# Patient Record
Sex: FEMALE | Race: AMERICAN INDIAN OR ALASKA NATIVE
[De-identification: names, ages, dates, MRNs, and addresses within clinical notes are randomized per-mention and may not be internally consistent; named-entity substitution may affect disease eponyms.]

---

## 2019-03-11 ENCOUNTER — HOSPITAL ENCOUNTER (EMERGENCY)
Dept: HOSPITAL 5 - ED | Age: 24
Discharge: HOME | End: 2019-03-11
Payer: COMMERCIAL

## 2019-03-11 VITALS — DIASTOLIC BLOOD PRESSURE: 88 MMHG | SYSTOLIC BLOOD PRESSURE: 120 MMHG

## 2019-03-11 DIAGNOSIS — F17.200: ICD-10-CM

## 2019-03-11 DIAGNOSIS — H66.002: Primary | ICD-10-CM

## 2019-03-11 DIAGNOSIS — N76.0: ICD-10-CM

## 2019-03-11 LAB
BACTERIA #/AREA URNS HPF: (no result) /HPF
BILIRUB UR QL STRIP: (no result)
BLOOD UR QL VISUAL: (no result)
MUCOUS THREADS #/AREA URNS HPF: (no result) /HPF
PH UR STRIP: 6 [PH] (ref 5–7)
RBC #/AREA URNS HPF: 44 /HPF (ref 0–6)
UROBILINOGEN UR-MCNC: < 2 MG/DL (ref ?–2)
WBC #/AREA URNS HPF: 6 /HPF (ref 0–6)

## 2019-03-11 PROCEDURE — 99284 EMERGENCY DEPT VISIT MOD MDM: CPT

## 2019-03-11 PROCEDURE — 87210 SMEAR WET MOUNT SALINE/INK: CPT

## 2019-03-11 PROCEDURE — 81001 URINALYSIS AUTO W/SCOPE: CPT

## 2019-03-11 PROCEDURE — 96372 THER/PROPH/DIAG INJ SC/IM: CPT

## 2019-03-11 PROCEDURE — 87591 N.GONORRHOEAE DNA AMP PROB: CPT

## 2019-03-11 NOTE — EMERGENCY DEPARTMENT REPORT
ED ENT HPI





- General


Chief complaint: Earache


Stated complaint: (R) EAR PAIN/VAG PAIN


Time Seen by Provider: 19 12:29


Source: patient


Mode of arrival: Ambulatory


Limitations: No Limitations





- History of Present Illness


Initial comments: 


24F female presents with complaint of approximately 4 days of right sided 

earache.  Patient is awake alert and oriented 3 fully lucid and does not appear

to be in acute distress.  Patient states she works in a  and is concerned

she may have gotten an ear infection from a child at  center.  Patient 

also states that she is experiencing approximately one week of vaginal 

discomfort and unusual discharge.  Patient is not overtly concerned for STD 

exposure at this time.  States she may have mild dysuria.  Denies smoking 

drinking or drug use.  LMP current.


MD complaint: ear pain


Onset/Timin


-: days(s)


Location: R ear


Severity: moderate


Severity scale (0 -10): 4


Quality: aching


Consistency: constant


Improves with: none


Worsens with: none





- Related Data


                                  Previous Rx's











 Medication  Instructions  Recorded  Last Taken  Type


 


Amoxicillin/K Clav Tab [Augmentin 1 tab PO Q12H #20 tablet 03/12/15 Unknown Rx





875MG]    


 


predniSONE [Deltasone] 50 mg PO QDAY #3 tab 03/12/15 Unknown Rx


 


HYDROcodone/ACETAMINOPHEN [Norco 1 each PO Q6H PRN #25 tablet 06/12/15 Unknown 

Rx





7.5-325 mg TAB]    


 


Ibuprofen [Motrin 800 MG tab] 800 mg PO Q8H #90 tablet 06/12/15 Unknown Rx


 


HYDROcodone/APAP 5-325 [[place] 1 each PO Q6HR PRN #14 tablet 08/23/15 Unknown Rx





5-325 mg TAB]    


 


Ibuprofen [Motrin 600 MG tab] 600 mg PO Q8H PRN #30 tablet 08/23/15 Unknown Rx


 


HYDROcodone/APAP 5-325 [[place] 1 each PO Q6HR PRN #15 tablet 16 Unknown Rx





5/325]    


 


cephALEXin [Keflex] 500 mg PO Q8HR #42 cap 16 Unknown Rx


 


metroNIDAZOLE [Flagyl TAB] 500 mg PO Q12HR #14 tab 16 Unknown Rx


 


Ibuprofen [Motrin 600 MG tab] 600 mg PO Q8H PRN #30 tablet 16 Unknown Rx


 


Cyclobenzaprine [Flexeril] 10 mg PO TID PRN #15 tablet 16 Unknown Rx


 


Ibuprofen [Motrin] 800 mg PO Q8HR PRN #15 tablet 16 Unknown Rx


 


Amoxicillin/Potassium Clav 1 each PO BID #14 tablet 19 Unknown Rx





[Augmentin 875-125 Tablet]    


 


metroNIDAZOLE [Metronidazole] 500 mg PO BID #14 tablet 19 Unknown Rx











                                    Allergies











Allergy/AdvReac Type Severity Reaction Status Date / Time


 


No Known Allergies Allergy   Verified 03/12/15 10:45














ED Dental HPI





- General


Chief complaint: Earache


Stated complaint: (R) EAR PAIN/VAG PAIN


Time Seen by Provider: 19 12:29


Source: patient


Mode of arrival: Ambulatory


Limitations: No Limitations





- Related Data


                                  Previous Rx's











 Medication  Instructions  Recorded  Last Taken  Type


 


Amoxicillin/K Clav Tab [Augmentin 1 tab PO Q12H #20 tablet 03/12/15 Unknown Rx





875MG]    


 


predniSONE [Deltasone] 50 mg PO QDAY #3 tab 03/12/15 Unknown Rx


 


HYDROcodone/ACETAMINOPHEN [[place] 1 each PO Q6H PRN #25 tablet 06/12/15 Unknown 

Rx





7.5-325 mg TAB]    


 


Ibuprofen [Motrin 800 MG tab] 800 mg PO Q8H #90 tablet 06/12/15 Unknown Rx


 


HYDROcodone/APAP 5-325 [[place] 1 each PO Q6HR PRN #14 tablet 08/23/15 Unknown Rx





5-325 mg TAB]    


 


Ibuprofen [Motrin 600 MG tab] 600 mg PO Q8H PRN #30 tablet 08/23/15 Unknown Rx


 


HYDROcodone/APAP 5-325 [[place] 1 each PO Q6HR PRN #15 tablet 16 Unknown Rx





5/325]    


 


cephALEXin [Keflex] 500 mg PO Q8HR #42 cap 16 Unknown Rx


 


metroNIDAZOLE [Flagyl TAB] 500 mg PO Q12HR #14 tab 16 Unknown Rx


 


Ibuprofen [Motrin 600 MG tab] 600 mg PO Q8H PRN #30 tablet 16 Unknown Rx


 


Cyclobenzaprine [Flexeril] 10 mg PO TID PRN #15 tablet 16 Unknown Rx


 


Ibuprofen [Motrin] 800 mg PO Q8HR PRN #15 tablet 16 Unknown Rx


 


Amoxicillin/Potassium Clav 1 each PO BID #14 tablet 19 Unknown Rx





[Augmentin 875-125 Tablet]    


 


metroNIDAZOLE [Metronidazole] 500 mg PO BID #14 tablet 19 Unknown Rx











                                    Allergies











Allergy/AdvReac Type Severity Reaction Status Date / Time


 


No Known Allergies Allergy   Verified 03/12/15 10:45














ED Review of Systems


ROS: 


Stated complaint: (R) EAR PAIN/VAG PAIN


Other details as noted in HPI





Constitutional: denies: chills, fever


Eyes: denies: eye pain, eye discharge, vision change


ENT: ear pain.  denies: throat pain


Respiratory: denies: cough, shortness of breath, wheezing


Cardiovascular: denies: chest pain, palpitations


Endocrine: no symptoms reported


Gastrointestinal: denies: abdominal pain, nausea, diarrhea


Genitourinary: dysuria, discharge.  denies: urgency


Musculoskeletal: denies: back pain, joint swelling, arthralgia


Skin: denies: rash, lesions


Neurological: denies: headache, weakness, paresthesias


Psychiatric: denies: anxiety, depression


Hematological/Lymphatic: denies: easy bleeding, easy bruising





ED Past Medical Hx





- Past Medical History


Previous Medical History?: No





- Surgical History


Past Surgical History?: Yes


Additional Surgical History: 2 screws in bilateral hips





- Social History


Smoking Status: Current Every Day Smoker


Substance Use Type: Alcohol





- Medications


Home Medications: 


                                Home Medications











 Medication  Instructions  Recorded  Confirmed  Last Taken  Type


 


Amoxicillin/K Clav Tab [Augmentin 1 tab PO Q12H #20 tablet 03/12/15  Unknown Rx





875MG]     


 


predniSONE [Deltasone] 50 mg PO QDAY #3 tab 03/12/15  Unknown Rx


 


HYDROcodone/ACETAMINOPHEN [Norco 1 each PO Q6H PRN #25 tablet 06/12/15  Unknown 

Rx





7.5-325 mg TAB]     


 


Ibuprofen [Motrin 800 MG tab] 800 mg PO Q8H #90 tablet 06/12/15  Unknown Rx


 


HYDROcodone/APAP 5-325 [[place] 1 each PO Q6HR PRN #14 tablet 08/23/15  Unknown Rx





5-325 mg TAB]     


 


Ibuprofen [Motrin 600 MG tab] 600 mg PO Q8H PRN #30 tablet 08/23/15  Unknown Rx


 


HYDROcodone/APAP 5-325 [[place] 1 each PO Q6HR PRN #15 tablet 16  Unknown Rx





5/325]     


 


cephALEXin [Keflex] 500 mg PO Q8HR #42 cap 16  Unknown Rx


 


metroNIDAZOLE [Flagyl TAB] 500 mg PO Q12HR #14 tab 16  Unknown Rx


 


Ibuprofen [Motrin 600 MG tab] 600 mg PO Q8H PRN #30 tablet 16  Unknown Rx


 


Cyclobenzaprine [Flexeril] 10 mg PO TID PRN #15 tablet 16  Unknown Rx


 


Ibuprofen [Motrin] 800 mg PO Q8HR PRN #15 tablet 16  Unknown Rx


 


Amoxicillin/Potassium Clav 1 each PO BID #14 tablet 19  Unknown Rx





[Augmentin 875-125 Tablet]     


 


metroNIDAZOLE [Metronidazole] 500 mg PO BID #14 tablet 19  Unknown Rx














ED Physical Exam





- General


Limitations: No Limitations


General appearance: alert, in no apparent distress





- Head


Head exam: Present: atraumatic, normocephalic





- Eye


Eye exam: Present: normal appearance





- ENT


ENT exam: Present: mucous membranes moist





- Expanded ENT Exam


  ** Expanded


TM/Canal exam: Erythema: Right TM, Cerumen Impaction: Right TM, Left TM (there 

is no mastoid tenderness on exam)





- Neck


Neck exam: Present: normal inspection





- Respiratory


Respiratory exam: Present: normal lung sounds bilaterally.  Absent: respiratory 

distress





- Cardiovascular


Cardiovascular Exam: Present: regular rate, normal rhythm.  Absent: systolic 

murmur, diastolic murmur, rubs, gallop





- GI/Abdominal


GI/Abdominal exam: Present: soft, normal bowel sounds





- 


Speculum exam: Present: vaginal discharge, vaginal bleeding


Bi-manual exam: Present: normal bi-manual exam





- Extremities Exam


Extremities exam: Present: normal inspection





- Back Exam


Back exam: Present: normal inspection





- Neurological Exam


Neurological exam: Present: alert, oriented X3





- Psychiatric


Psychiatric exam: Present: normal affect, normal mood





- Skin


Skin exam: Present: warm, dry, intact, normal color.  Absent: rash





ED Course


                                   Vital Signs











  19





  12:30 18:01


 


Temperature 98.5 F 


 


Pulse Rate 93 H 90


 


Respiratory 18 18





Rate  


 


Blood Pressure 123/95 


 


Blood Pressure  120/88





[Right]  


 


O2 Sat by Pulse 100 99





Oximetry  














ED Medical Decision Making





- Medical Decision Making


A/P: Otitis media, bacterial vaginosis, possible


1-empiric treatment for otitis media with Augmentin course


2-empiric treatment for BV with metronidazole course


3-patients treated empirically for possible GC infection with azithromycin and 

ceftriaxone


4- follow-up with primary care


Critical care attestation.: 


If time is entered above; I have spent that time in minutes in the direct care 

of this critically ill patient, excluding procedure time.








ED Disposition


Clinical Impression: 


 Bacterial vaginosis, Vaginal discharge





Otitis media


Qualifiers:


 Otitis media type: suppurative Chronicity: acute Laterality: unspecified 

laterality Recurrence: not specified as recurrent Spontaneous tympanic membrane 

rupture: without spontaneous rupture Qualified Code(s): H66.009 - Acute 

suppurative otitis media without spontaneous rupture of ear drum, unspecified 

ear





Disposition: DC- TO HOME OR SELFCARE


Is pt being admited?: No


Does the pt Need Aspirin: No


Condition: Stable


Instructions:  Bacterial Vaginosis (ED), Otitis Media (ED)


Prescriptions: 


Amoxicillin/Potassium Clav [Augmentin 875-125 Tablet] 1 each PO BID #14 tablet


metroNIDAZOLE [Metronidazole] 500 mg PO BID #14 tablet


Referrals: 


[facility], MD [Primary Care Provider] - 3-5 Days


Forms:  [facility]I Treatment and Prevention, Work/School Release Form(ED)


Time of Disposition: 17:43

## 2019-03-11 NOTE — EMERGENCY DEPARTMENT REPORT
Blank Doc





- Documentation


Documentation: 





This is a 24-year-old female that presents with right ear pain and vaginal dis

charged with dysuria.  Stated is on her menstrual cycle today.





This initial assessment/diagnostic orders/clinical plan/treatment(s) is/are 

subject to change based on patient's health status, clinical progression and re-

assessment by fellow clinical providers in the ED.  Further treatment and workup

at subsequent clinical providers discretion.  Patient/guardians urged not to 

elope from the ED as their condition may be serious if not clinically assessed 

and managed.  Initial orders include:


1- Patient sent to [facility] for further evaluation and treatment


2- wet prep


3- UA

## 2021-10-28 ENCOUNTER — HOSPITAL ENCOUNTER (OUTPATIENT)
Dept: HOSPITAL 5 - TRG | Age: 26
Discharge: HOME | End: 2021-10-28
Attending: OBSTETRICS & GYNECOLOGY
Payer: COMMERCIAL

## 2021-10-28 DIAGNOSIS — Z3A.40: ICD-10-CM

## 2021-10-28 DIAGNOSIS — Z34.93: Primary | ICD-10-CM

## 2021-10-28 PROCEDURE — 76819 FETAL BIOPHYS PROFIL W/O NST: CPT

## 2021-10-28 PROCEDURE — 76815 OB US LIMITED FETUS(S): CPT

## 2021-10-28 PROCEDURE — 59025 FETAL NON-STRESS TEST: CPT

## 2021-10-28 NOTE — ULTRASOUND REPORT
ULTRASOUND OBSTETRIC LIMITED 

ULTRASOUND FETAL BIOPHYSICAL PROFILE



INDICATION / CLINICAL INFORMATION: JHONATHAN . IUP at 40.3 weeks. NRFHT..

Clinical Gestational Age (GA) in weeks, days: 40, 3 



TECHNIQUE: Transabdominal.



COMPARISON: None available.



FINDINGS:



FETAL BREATHING MOVEMENT = 2

GROSS BODY MOVEMENT = 2

FETAL TONE = 2

QUALITATIVE AMNIOTIC FLUID VOLUME = 2



TOTAL BIOPHYSICAL SCORE = 8/8



FETAL HEART RATE (beats per minute): 137 



AMNIOTIC FLUID INDEX (cm) =  6.1   (normal = 7-24 cm)

PRESENTATION: Cephalic. 



ADDITIONAL FINDINGS: None.



IMPRESSION:

1. Fetal Biophysical Score = 8/8 

2. Additional findings as above



Signer Name: Ozzy Rey DO 

Signed: 10/28/2021 8:05 PM

Workstation Name: [id number]

## 2021-10-28 NOTE — ULTRASOUND REPORT
ULTRASOUND OBSTETRIC LIMITED 

ULTRASOUND FETAL BIOPHYSICAL PROFILE



INDICATION / CLINICAL INFORMATION: JHONATHAN . IUP at 40.3 weeks. NRFHT..

Clinical Gestational Age (GA) in weeks, days: 40, 3 



TECHNIQUE: Transabdominal.



COMPARISON: None available.



FINDINGS:



FETAL BREATHING MOVEMENT = 2

GROSS BODY MOVEMENT = 2

FETAL TONE = 2

QUALITATIVE AMNIOTIC FLUID VOLUME = 2



TOTAL BIOPHYSICAL SCORE = 8/8



FETAL HEART RATE (beats per minute): 137 



AMNIOTIC FLUID INDEX (cm) =  6.1   (normal = 7-24 cm)

PRESENTATION: Cephalic. 



ADDITIONAL FINDINGS: None.



IMPRESSION:

1. Fetal Biophysical Score = 8/8 

2. Additional findings as above



Signer Name: Ozzy Rey DO 

Signed: 10/28/2021 8:05 PM

Workstation Name: [id number]

## 2022-08-22 ENCOUNTER — HOSPITAL ENCOUNTER (EMERGENCY)
Dept: HOSPITAL 5 - ED | Age: 27
LOS: 1 days | Discharge: HOME | End: 2022-08-23
Payer: COMMERCIAL

## 2022-08-22 VITALS — SYSTOLIC BLOOD PRESSURE: 126 MMHG | DIASTOLIC BLOOD PRESSURE: 87 MMHG

## 2022-08-22 DIAGNOSIS — R51.9: Primary | ICD-10-CM

## 2022-08-22 DIAGNOSIS — Z87.891: ICD-10-CM

## 2022-08-22 PROCEDURE — 70450 CT HEAD/BRAIN W/O DYE: CPT

## 2022-08-22 PROCEDURE — 99283 EMERGENCY DEPT VISIT LOW MDM: CPT

## 2022-08-22 PROCEDURE — 96372 THER/PROPH/DIAG INJ SC/IM: CPT

## 2022-08-22 NOTE — EMERGENCY DEPARTMENT REPORT
ED Headache HPI





- General


Chief Complaint: Eye Problems


Stated Complaint: RIGHT EYE INJURY/CANT SEE


Time Seen by Provider: 08/22/22 17:20





- History of Present Illness


Initial Comments: 





27-year-old black female with no past medical history resents to the emergency 

department for evaluation of headache and vision changes to the right only that 

started this morning.  She states that when she woke up she had a sharp severe 

headache to the right side only followed by some blurred vision.  She states 

that her vision has improved significantly but she still has a headache.  She 

denies injury or trauma, fever, photophobia, dizziness, nausea, and vomiting.  

She states that she has had some congestion.


Timing/Duration: 1-3 hours


Quality: moderate, severe


Head Injury Location: frontal


Associated Symptoms: facial pain, nasal congestion, vision changes.  denies: 

confusion, fatigue, fever/chills, flushing, loss of consciousness, n

ausea/vomiting, nasal drainage, numbness in legs/feet, rash, seizures, sinus 

infection, stiff neck, weakness


Allergies/Adverse Reactions: 


Allergies





No Known Allergies Allergy (Verified 03/12/15 10:45)


   








Home Medications: 


Ambulatory Orders





Prenat 115/Iron Fum/Folic/Dss PO DAILY 11/03/21 


Ferrous Sulfate [Feosol 325 MG tab] 325 mg PO BID #60 tablet 11/05/21 


Ibuprofen [Motrin] 800 mg PO TID PRN #30 tablet 11/05/21 


oxyCODONE /ACETAMINOPHEN [Percocet 5/325 mg] 1 - 2 tab PO Q6HR PRN #20 tablet 

11/05/21 


NIFEdipine XL [Procardia Xl] 30 mg PO QDAY 30 Days #30 tablet 11/09/21 


labetaloL [Labetalol 100mg TAB] 300 mg PO BID 30 Days #60 tablet 11/09/21 


Levocetirizine Dihydrochloride [Xyzal] 5 mg PO QPM #15 tab 08/22/22 


predniSONE [Deltasone] 50 mg PO QDAY 5 Days #5 tab 08/22/22 











ED Review of Systems


ROS: 


Stated complaint: RIGHT EYE INJURY/CANT SEE


Other details as noted in HPI





Comment: All other systems reviewed and negative


Constitutional: denies: chills, fever


Eyes: eye pain, vision change


ENT: congestion


Respiratory: denies: shortness of breath


Cardiovascular: denies: chest pain


Gastrointestinal: denies: abdominal pain, nausea, vomiting


Musculoskeletal: denies: back pain


Skin: denies: rash, lesions


Neurological: headache.  denies: weakness, numbness, paresthesias, confusion, 

abnormal gait, vertigo


Psychiatric: denies: anxiety, depression





ED Past Medical Hx





- Past Medical History


Hx Hypertension: No


Hx Diabetes: No


Hx Deep Vein Thrombosis: No


Hx Renal Disease: No


Hx Sickle Cell Disease: No


Hx Seizures: No


Hx Asthma: No


Hx HIV: No





- Surgical History


Additional Surgical History: 2 screws in bilateral hips





- Social History


Smoking Status: Former Smoker





- Medications


Home Medications: 


                                Home Medications











 Medication  Instructions  Recorded  Confirmed  Last Taken  Type


 


Prenat 115/Iron Fum/Folic/Dss PO DAILY 11/03/21  10/24/21 History


 


Ferrous Sulfate [Feosol 325 MG tab] 325 mg PO BID #60 tablet 11/05/21  Unknown 

Rx


 


Ibuprofen [Motrin] 800 mg PO TID PRN #30 tablet 11/05/21  Unknown Rx


 


oxyCODONE /ACETAMINOPHEN [Percocet 1 - 2 tab PO Q6HR PRN #20 tablet 11/05/21  

Unknown Rx





5/325 mg]     


 


NIFEdipine XL [Procardia Xl] 30 mg PO QDAY 30 Days #30 tablet 11/09/21  Unknown 

Rx


 


labetaloL [Labetalol 100mg TAB] 300 mg PO BID 30 Days #60 tablet 11/09/21  

Unknown Rx


 


Levocetirizine Dihydrochloride 5 mg PO QPM #15 tab 08/22/22  Unknown Rx





[Xyzal]     


 


predniSONE [Deltasone] 50 mg PO QDAY 5 Days #5 tab 08/22/22  Unknown Rx














ED Physical Exam





- General


Limitations: No Limitations


General appearance: alert, in no apparent distress





- Head


Head exam: Present: atraumatic, normocephalic





- Eye


Eye exam: Present: normal appearance, PERRL.  Absent: conjunctival injection, 

periorbital swelling, periorbital tenderness





- Expanded Eye Exam


  ** Expanded


Pupils: Regular, Round: Bilateral, Reactive: Bilateral


Sclera/Conjunctival: Normal Inspection: Bilateral





- ENT


ENT exam: Present: mucous membranes moist, TM's normal bilaterally, normal 

external ear exam.  Absent: normal exam (Bilateral nasal mucosal edema with 

clear rhinorrhea), normal orophraynx (Erythema noted to posterior oropharynx)





- Neck


Neck exam: Present: normal inspection.  Absent: tenderness





- Respiratory


Respiratory exam: Present: normal lung sounds bilaterally.  Absent: respiratory 

distress, wheezes, rales, rhonchi, chest wall tenderness





- Cardiovascular


Cardiovascular Exam: Present: regular rate, normal heart sounds





- GI/Abdominal


GI/Abdominal exam: Present: soft, normal bowel sounds.  Absent: distended, 

tenderness, guarding, rebound, rigid





- Extremities Exam


Extremities exam: Present: normal inspection, normal capillary refill





- Back Exam


Back exam: Present: normal inspection.  Absent: CVA tenderness (R), CVA 

tenderness (L)





- Neurological Exam


Neurological exam: Present: alert, oriented X3, CN II-XII intact, normal gait





- Expanded Neurological Exam


  ** Expanded


Patient oriented to: Present: person, place, time


Speech: Present: fluid speech


Cranial nerves: EOM's Intact: Normal, Gag Reflex: Normal, Tongue Deviation: 

Normal, Nystagmus: Normal


Ataxia: Absent: yes


Cerebellar function: [place]: Normal


Sensory exam: Upper Extremity Light Touch: Normal, Upper Extremity Temperature: 

Normal, Lower Extremity Light Touch: Normal, Lower Extremity Temperature: Normal


Motor strength exam: RUE: 5, LUE: 5, RLE: 5, LLE: 5


Best Eye Response ([place]): (4) open spontaneously


Best Motor Response ([place]): (6) obeys commands


Best Verbal Response ([place]): (5) oriented


[place] Total: 15





- Psychiatric


Psychiatric exam: Present: normal affect, normal mood





- Skin


Skin exam: Present: warm, dry, intact, normal color





ED Course


                                   Vital Signs











  08/22/22 08/22/22 08/22/22





  14:33 18:48 20:45


 


Temperature 98.9 F  


 


Pulse Rate 83  81


 


Respiratory 14 14 14





Rate   


 


Blood Pressure 123/85  126/87





[Right]   


 


O2 Sat by Pulse 98  100





Oximetry   














- Reevaluation(s)


Reevaluation #1: 





08/22/22 19:24


Headache and vision improved after medication.





ED Medical Decision Making





- Radiology Data


Radiology results: report reviewed, image reviewed





CT head and neck without contrast:





 FINDINGS:  


 HEMORRHAGE: No evidence of intracranial hemorrhage or extra-axial fluid 

collection.  


 EXTRA-AXIAL SPACES: Cortical sulci, sylvian fissures and basilar cisterns have 

an unremarkable 


appearance.  


 VENTRICULAR SYSTEM: The third and lateral ventricles are of normal size and 

configuration.  


 CEREBRAL PARENCHYMA: No areas of abnormal brain parenchymal attenuation are 

identified. There is no


indication of recent infarction.   


 MIDLINE SHIFT OR HERNIATION: There is no mass effect.  


 CEREBELLUM / BRAINSTEM: Brainstem and cerebellum have an unremarkable 

appearance.  


 MIDLINE STRUCTURES:No abnormalities of the pituitary gland or pineal region are

 identified.  


 


 CRANIOCERVICAL JUNCTION: There is a suggestion of borderline cerebellar 

tonsillar ectopia. Heart an


artifact limits evaluation of the craniocervical junction on this study.  


 INTRACRANIAL VESSELS:No abnormalities are identified on this noncontrast head 

CT.  


 ORBITS: visualized portions of the orbits have an unremarkable appearance.  


 SOFT TISSUES of HEAD: No significant abnormality.  


 CALVARIUM: Evaluation of bone windows reveals no abnormalities.  


 PARANASAL SINUSES / MASTOID AIR CELLS: Frontal sinuses did not develop in this 

individual. 


Visualized paranasal sinuses are free from inflammatory mucosal disease. Mastoid

 air cells are 


normally pneumatized.  


 


 IMPRESSION:  


 1. No significant intracranial abnormality.  





- Medical Decision Making





27-year-old black female with no past medical history resents to the emergency 

department for evaluation of headache and vision changes to the right only that 

started this morning.  She states that when she woke up she had a sharp severe 

headache to the right side only followed by some blurred vision.  She states 

that her vision has improved significantly but she still has a headache.  She 

denies injury or trauma, fever, photophobia, dizziness, nausea, and vomiting.  

She states that she has had some congestion.





Physical exam unremarkable.  CT head and brain without any acute abnormalities 

noted.  Headache resolved after medications.  Vision improved.  Patient advised 

to take medication as prescribed, follow-up with ophthalmology, follow-up with 

her primary care provider, and return to the emergency department as needed.  

She verbalizes understanding of and agreement with plan of care.


Critical care attestation.: 


If time is entered above; I have spent that time in minutes in the direct care 

of this critically ill patient, excluding procedure time.








ED Disposition


Clinical Impression: 


 Headache





Disposition: 01 HOME / SELF CARE / HOMELESS


Is pt being admited?: No


Does the pt Need Aspirin: No


Condition: Stable


Instructions:  General Headache Without Cause, Easy-to-Read


Additional Instructions: 


Take medications as prescribed.  Follow-up with ophthalmology in the next 1 to 2

 days if no improvement or worsening symptoms.  Return to the emergency 

department as needed.


Prescriptions: 


predniSONE [Deltasone] 50 mg PO QDAY 5 Days #5 tab


Levocetirizine Dihydrochloride [Xyzal] 5 mg PO QPM #15 tab


Referrals: 


CHIRSTEN AGUIRRE MD [Staff Physician] - 3-5 Days


DAREN GARNER MD [Staff Physician] - 3-5 Days


ANISA MCCOY MD [Staff Physician] - 3-5 Days


ENEIDA OTT MD [Staff Physician] - 3-5 Days


Forms:  Work/School Release Form(ED)


Time of Disposition: 19:28

## 2022-08-22 NOTE — CAT SCAN REPORT
CT HEAD WITHOUT CONTRAST



INDICATION / CLINICAL INFORMATION:

HA, BLURRED VISION.



TECHNIQUE:

All CT scans at this location are performed using CT dose reduction for ALARA by means of automated e
xposure control. 



COMPARISON:

None available.



FINDINGS:

HEMORRHAGE: No evidence of intracranial hemorrhage or extra-axial fluid collection.

EXTRA-AXIAL SPACES: Cortical sulci, sylvian fissures and basilar cisterns have an unremarkable appear
ance.

VENTRICULAR SYSTEM: The third and lateral ventricles are of normal size and configuration.

CEREBRAL PARENCHYMA: No areas of abnormal brain parenchymal attenuation are identified. There is no i
ndication of recent infarction. 

MIDLINE SHIFT OR HERNIATION: There is no mass effect.

CEREBELLUM / BRAINSTEM: Brainstem and cerebellum have an unremarkable appearance.

MIDLINE STRUCTURES:No abnormalities of the pituitary gland or pineal region are identified.



CRANIOCERVICAL JUNCTION: There is a suggestion of borderline cerebellar tonsillar ectopia. Heart an a
rtifact limits evaluation of the craniocervical junction on this study.

INTRACRANIAL VESSELS:No abnormalities are identified on this noncontrast head CT.

ORBITS: visualized portions of the orbits have an unremarkable appearance.

SOFT TISSUES of HEAD: No significant abnormality.

CALVARIUM: Evaluation of bone windows reveals no abnormalities.

PARANASAL SINUSES / MASTOID AIR CELLS: Frontal sinuses did not develop in this individual. Visualized
 paranasal sinuses are free from inflammatory mucosal disease. Mastoid air cells are normally pneumat
ized.



IMPRESSION:

1. No significant intracranial abnormality.



Signer Name: Javid Stacy MD 

Signed: 8/22/2022 6:57 PM

Workstation Name: [id number]